# Patient Record
Sex: FEMALE | Race: WHITE | HISPANIC OR LATINO | URBAN - METROPOLITAN AREA
[De-identification: names, ages, dates, MRNs, and addresses within clinical notes are randomized per-mention and may not be internally consistent; named-entity substitution may affect disease eponyms.]

---

## 2017-05-01 ENCOUNTER — NEW PATIENT (OUTPATIENT)
Dept: URBAN - METROPOLITAN AREA CLINIC 56 | Facility: CLINIC | Age: 62
End: 2017-05-01
Payer: MEDICAID

## 2017-05-01 DIAGNOSIS — E10.9 TYPE 1 DIABETES MELLITUS WITHOUT COMPLICATIONS: ICD-10-CM

## 2017-05-01 PROCEDURE — 92004 COMPRE OPH EXAM NEW PT 1/>: CPT | Performed by: OPTOMETRIST

## 2017-05-01 PROCEDURE — 92250 FUNDUS PHOTOGRAPHY W/I&R: CPT | Performed by: OPTOMETRIST

## 2017-05-01 RX ORDER — ACYCLOVIR 800 MG/1
800 MG TABLET ORAL
Qty: 60 | Refills: 3 | Status: INACTIVE
Start: 2017-05-01 | End: 2017-05-10

## 2017-05-01 RX ORDER — PREDNISOLONE ACETATE 10 MG/ML
1 % SUSPENSION/ DROPS OPHTHALMIC
Qty: 1 | Refills: 1 | Status: INACTIVE
Start: 2017-05-01 | End: 2017-07-17

## 2017-05-01 ASSESSMENT — VISUAL ACUITY
OD: 20/20
OS: 20/20

## 2017-05-01 ASSESSMENT — KERATOMETRY
OD: 45.40
OS: 46.11

## 2017-05-01 ASSESSMENT — INTRAOCULAR PRESSURE
OS: 19
OD: 18

## 2017-05-08 ENCOUNTER — FOLLOW UP ESTABLISHED (OUTPATIENT)
Dept: URBAN - METROPOLITAN AREA CLINIC 56 | Facility: CLINIC | Age: 62
End: 2017-05-08
Payer: MEDICAID

## 2017-05-08 PROCEDURE — 92012 INTRM OPH EXAM EST PATIENT: CPT | Performed by: OPTOMETRIST

## 2017-05-08 ASSESSMENT — INTRAOCULAR PRESSURE
OD: 14
OS: 14

## 2017-05-22 ENCOUNTER — FOLLOW UP ESTABLISHED (OUTPATIENT)
Dept: URBAN - METROPOLITAN AREA CLINIC 56 | Facility: CLINIC | Age: 62
End: 2017-05-22
Payer: MEDICAID

## 2017-05-22 DIAGNOSIS — B00.52 HERPESVIRAL KERATITIS: Primary | ICD-10-CM

## 2017-05-22 PROCEDURE — 92012 INTRM OPH EXAM EST PATIENT: CPT | Performed by: OPTOMETRIST

## 2017-05-22 ASSESSMENT — INTRAOCULAR PRESSURE
OS: 21
OD: 19

## 2017-06-05 ENCOUNTER — FOLLOW UP ESTABLISHED (OUTPATIENT)
Dept: URBAN - METROPOLITAN AREA CLINIC 56 | Facility: CLINIC | Age: 62
End: 2017-06-05
Payer: MEDICAID

## 2017-06-05 PROCEDURE — 92012 INTRM OPH EXAM EST PATIENT: CPT | Performed by: OPTOMETRIST

## 2017-06-05 ASSESSMENT — INTRAOCULAR PRESSURE
OS: 22
OD: 19

## 2017-07-17 ENCOUNTER — FOLLOW UP ESTABLISHED (OUTPATIENT)
Dept: URBAN - METROPOLITAN AREA CLINIC 56 | Facility: CLINIC | Age: 62
End: 2017-07-17
Payer: MEDICAID

## 2017-07-17 PROCEDURE — 92012 INTRM OPH EXAM EST PATIENT: CPT | Performed by: OPTOMETRIST

## 2017-07-17 RX ORDER — ACYCLOVIR 800 MG/1
800 MG TABLET ORAL
Qty: 60 | Refills: 6 | Status: INACTIVE
Start: 2017-07-17 | End: 2017-07-26

## 2017-07-17 RX ORDER — PREDNISOLONE ACETATE 10 MG/ML
1 % SUSPENSION/ DROPS OPHTHALMIC
Qty: 1 | Refills: 1 | Status: INACTIVE
Start: 2017-07-17 | End: 2017-08-14

## 2017-07-17 ASSESSMENT — INTRAOCULAR PRESSURE
OS: 20
OD: 19

## 2017-07-24 ENCOUNTER — FOLLOW UP ESTABLISHED (OUTPATIENT)
Dept: URBAN - METROPOLITAN AREA CLINIC 56 | Facility: CLINIC | Age: 62
End: 2017-07-24
Payer: MEDICAID

## 2017-07-24 PROCEDURE — 92012 INTRM OPH EXAM EST PATIENT: CPT | Performed by: OPTOMETRIST

## 2017-07-24 ASSESSMENT — INTRAOCULAR PRESSURE
OS: 20
OD: 22

## 2017-08-14 ENCOUNTER — FOLLOW UP ESTABLISHED (OUTPATIENT)
Dept: URBAN - METROPOLITAN AREA CLINIC 56 | Facility: CLINIC | Age: 62
End: 2017-08-14
Payer: MEDICAID

## 2017-08-14 PROCEDURE — 92012 INTRM OPH EXAM EST PATIENT: CPT | Performed by: OPTOMETRIST

## 2017-08-14 RX ORDER — PREDNISOLONE ACETATE 10 MG/ML
1 % SUSPENSION/ DROPS OPHTHALMIC
Qty: 1 | Refills: 1 | Status: INACTIVE
Start: 2017-08-14 | End: 2017-09-13

## 2017-08-14 ASSESSMENT — INTRAOCULAR PRESSURE
OD: 20
OS: 20

## 2017-09-13 ENCOUNTER — FOLLOW UP ESTABLISHED (OUTPATIENT)
Dept: URBAN - METROPOLITAN AREA CLINIC 56 | Facility: CLINIC | Age: 62
End: 2017-09-13
Payer: MEDICAID

## 2017-09-13 PROCEDURE — 92012 INTRM OPH EXAM EST PATIENT: CPT | Performed by: OPTOMETRIST

## 2017-09-13 RX ORDER — PREDNISOLONE ACETATE 10 MG/ML
1 % SUSPENSION/ DROPS OPHTHALMIC
Qty: 1 | Refills: 0 | Status: INACTIVE
Start: 2017-09-13 | End: 2017-10-02

## 2017-09-13 ASSESSMENT — INTRAOCULAR PRESSURE
OS: 19
OD: 19

## 2017-10-02 ENCOUNTER — FOLLOW UP ESTABLISHED (OUTPATIENT)
Dept: URBAN - METROPOLITAN AREA CLINIC 56 | Facility: CLINIC | Age: 62
End: 2017-10-02
Payer: MEDICAID

## 2017-10-02 DIAGNOSIS — H11.32 SUBCONJUNCTIVAL HEMORRHAGE OF LEFT EYE: Primary | ICD-10-CM

## 2017-10-02 DIAGNOSIS — R51 HEADACHE: ICD-10-CM

## 2017-10-02 PROCEDURE — 92014 COMPRE OPH EXAM EST PT 1/>: CPT | Performed by: OPTOMETRIST

## 2017-10-02 PROCEDURE — 92134 CPTRZ OPH DX IMG PST SGM RTA: CPT | Performed by: OPTOMETRIST

## 2017-10-02 ASSESSMENT — INTRAOCULAR PRESSURE
OD: 20
OS: 20

## 2017-11-08 ENCOUNTER — FOLLOW UP ESTABLISHED (OUTPATIENT)
Dept: URBAN - METROPOLITAN AREA CLINIC 56 | Facility: CLINIC | Age: 62
End: 2017-11-08
Payer: MEDICAID

## 2017-11-08 PROCEDURE — 92012 INTRM OPH EXAM EST PATIENT: CPT | Performed by: OPTOMETRIST

## 2017-11-08 RX ORDER — ACYCLOVIR 800 MG/1
800 MG TABLET ORAL
Qty: 78 | Refills: 6 | Status: INACTIVE
Start: 2017-11-08 | End: 2017-11-21

## 2017-11-08 RX ORDER — PREDNISOLONE ACETATE 10 MG/ML
1 % SUSPENSION/ DROPS OPHTHALMIC
Qty: 1 | Refills: 3 | Status: INACTIVE
Start: 2017-11-08 | End: 2017-11-09

## 2017-11-08 ASSESSMENT — INTRAOCULAR PRESSURE
OD: 19
OS: 22

## 2017-11-10 ENCOUNTER — FOLLOW UP ESTABLISHED (OUTPATIENT)
Dept: URBAN - METROPOLITAN AREA CLINIC 56 | Facility: CLINIC | Age: 62
End: 2017-11-10
Payer: MEDICAID

## 2017-11-10 PROCEDURE — 92012 INTRM OPH EXAM EST PATIENT: CPT | Performed by: OPTOMETRIST

## 2017-11-10 ASSESSMENT — INTRAOCULAR PRESSURE
OS: 26
OD: 22

## 2017-11-13 ENCOUNTER — FOLLOW UP ESTABLISHED (OUTPATIENT)
Dept: URBAN - METROPOLITAN AREA CLINIC 56 | Facility: CLINIC | Age: 62
End: 2017-11-13
Payer: MEDICAID

## 2017-11-13 PROCEDURE — 92012 INTRM OPH EXAM EST PATIENT: CPT | Performed by: OPTOMETRIST

## 2017-11-13 ASSESSMENT — INTRAOCULAR PRESSURE
OS: 18
OD: 24

## 2017-11-16 ENCOUNTER — FOLLOW UP ESTABLISHED (OUTPATIENT)
Dept: URBAN - METROPOLITAN AREA CLINIC 44 | Facility: CLINIC | Age: 62
End: 2017-11-16
Payer: MEDICAID

## 2017-11-16 DIAGNOSIS — H26.492 OTHER SECONDARY CATARACT, LEFT EYE: ICD-10-CM

## 2017-11-16 PROCEDURE — 92012 INTRM OPH EXAM EST PATIENT: CPT | Performed by: OPHTHALMOLOGY

## 2017-11-16 RX ORDER — TIMOLOL MALEATE 5 MG/ML
0.5 % SOLUTION/ DROPS OPHTHALMIC
Qty: 1 | Refills: 2 | Status: INACTIVE
Start: 2017-11-16 | End: 2017-12-12

## 2017-11-16 RX ORDER — ATROPINE SULFATE 10 MG/ML
1 % SOLUTION/ DROPS OPHTHALMIC
Qty: 1 | Refills: 1 | Status: INACTIVE
Start: 2017-11-16 | End: 2017-12-12

## 2017-11-21 ENCOUNTER — FOLLOW UP ESTABLISHED (OUTPATIENT)
Dept: URBAN - METROPOLITAN AREA CLINIC 56 | Facility: CLINIC | Age: 62
End: 2017-11-21
Payer: MEDICAID

## 2017-11-21 PROCEDURE — 92012 INTRM OPH EXAM EST PATIENT: CPT | Performed by: OPTOMETRIST

## 2017-11-21 RX ORDER — ACYCLOVIR 800 MG/1
800 MG TABLET ORAL
Qty: 150 | Refills: 6 | Status: INACTIVE
Start: 2017-11-21 | End: 2018-07-16

## 2017-11-21 ASSESSMENT — INTRAOCULAR PRESSURE
OS: 12
OD: 12

## 2017-12-12 ENCOUNTER — FOLLOW UP ESTABLISHED (OUTPATIENT)
Dept: URBAN - METROPOLITAN AREA CLINIC 56 | Facility: CLINIC | Age: 62
End: 2017-12-12
Payer: MEDICAID

## 2017-12-12 PROCEDURE — 92012 INTRM OPH EXAM EST PATIENT: CPT | Performed by: OPTOMETRIST

## 2017-12-12 RX ORDER — ATROPINE SULFATE 10 MG/ML
1 % SOLUTION/ DROPS OPHTHALMIC
Qty: 1 | Refills: 1 | Status: INACTIVE
Start: 2017-12-12 | End: 2018-03-21

## 2017-12-12 RX ORDER — TIMOLOL MALEATE 5 MG/ML
0.5 % SOLUTION/ DROPS OPHTHALMIC
Qty: 1 | Refills: 2 | Status: INACTIVE
Start: 2017-12-12 | End: 2018-07-16

## 2017-12-12 ASSESSMENT — INTRAOCULAR PRESSURE
OD: 19
OS: 17

## 2018-01-19 ENCOUNTER — FOLLOW UP ESTABLISHED (OUTPATIENT)
Dept: URBAN - METROPOLITAN AREA CLINIC 56 | Facility: CLINIC | Age: 63
End: 2018-01-19
Payer: MEDICAID

## 2018-01-19 PROCEDURE — 92012 INTRM OPH EXAM EST PATIENT: CPT | Performed by: OPTOMETRIST

## 2018-01-19 ASSESSMENT — INTRAOCULAR PRESSURE
OD: 22
OS: 23

## 2018-03-21 ENCOUNTER — FOLLOW UP ESTABLISHED (OUTPATIENT)
Dept: URBAN - METROPOLITAN AREA CLINIC 56 | Facility: CLINIC | Age: 63
End: 2018-03-21
Payer: MEDICAID

## 2018-03-21 PROCEDURE — 92012 INTRM OPH EXAM EST PATIENT: CPT | Performed by: OPTOMETRIST

## 2018-03-21 RX ORDER — PREDNISOLONE ACETATE 10 MG/ML
1 % SUSPENSION/ DROPS OPHTHALMIC
Qty: 1 | Refills: 1 | Status: INACTIVE
Start: 2018-03-21 | End: 2018-05-16

## 2018-03-21 ASSESSMENT — INTRAOCULAR PRESSURE
OD: 18
OS: 18

## 2018-04-18 ENCOUNTER — FOLLOW UP ESTABLISHED (OUTPATIENT)
Dept: URBAN - METROPOLITAN AREA CLINIC 56 | Facility: CLINIC | Age: 63
End: 2018-04-18
Payer: MEDICAID

## 2018-04-18 PROCEDURE — 92012 INTRM OPH EXAM EST PATIENT: CPT | Performed by: OPTOMETRIST

## 2018-04-18 ASSESSMENT — INTRAOCULAR PRESSURE
OS: 17
OD: 19

## 2018-05-16 ENCOUNTER — FOLLOW UP ESTABLISHED (OUTPATIENT)
Dept: URBAN - METROPOLITAN AREA CLINIC 56 | Facility: CLINIC | Age: 63
End: 2018-05-16
Payer: MEDICAID

## 2018-05-16 PROCEDURE — 92012 INTRM OPH EXAM EST PATIENT: CPT | Performed by: OPTOMETRIST

## 2018-05-16 RX ORDER — PREDNISOLONE ACETATE 10 MG/ML
1 % SUSPENSION/ DROPS OPHTHALMIC
Qty: 1 | Refills: 1 | Status: INACTIVE
Start: 2018-05-16 | End: 2019-01-09

## 2018-05-16 ASSESSMENT — INTRAOCULAR PRESSURE
OD: 19
OS: 17

## 2018-06-13 ENCOUNTER — FOLLOW UP ESTABLISHED (OUTPATIENT)
Dept: URBAN - METROPOLITAN AREA CLINIC 56 | Facility: CLINIC | Age: 63
End: 2018-06-13
Payer: MEDICAID

## 2018-06-13 DIAGNOSIS — H04.123 TEAR FILM INSUFFICIENCY OF BILATERAL LACRIMAL GLANDS: ICD-10-CM

## 2018-06-13 PROCEDURE — 92012 INTRM OPH EXAM EST PATIENT: CPT | Performed by: OPTOMETRIST

## 2018-06-13 RX ORDER — HYPROMELLOSE 3 MG/G
0.3 % GEL OPHTHALMIC
Qty: 1 | Refills: 0 | Status: ACTIVE
Start: 2018-06-13

## 2018-06-13 ASSESSMENT — INTRAOCULAR PRESSURE
OS: 16
OD: 16

## 2018-07-16 ENCOUNTER — FOLLOW UP ESTABLISHED (OUTPATIENT)
Dept: URBAN - METROPOLITAN AREA CLINIC 56 | Facility: CLINIC | Age: 63
End: 2018-07-16
Payer: MEDICAID

## 2018-07-16 DIAGNOSIS — Z96.1 PRESENCE OF INTRAOCULAR LENS: ICD-10-CM

## 2018-07-16 PROCEDURE — 92134 CPTRZ OPH DX IMG PST SGM RTA: CPT | Performed by: OPTOMETRIST

## 2018-07-16 PROCEDURE — 92014 COMPRE OPH EXAM EST PT 1/>: CPT | Performed by: OPTOMETRIST

## 2018-07-16 RX ORDER — ACYCLOVIR 800 MG/1
800 MG TABLET ORAL
Qty: 150 | Refills: 6 | Status: INACTIVE
Start: 2018-07-16 | End: 2019-01-09

## 2018-07-16 RX ORDER — TIMOLOL MALEATE 5 MG/ML
0.5 % SOLUTION/ DROPS OPHTHALMIC
Qty: 3 | Refills: 3 | Status: INACTIVE
Start: 2018-07-16 | End: 2019-01-09

## 2018-07-16 ASSESSMENT — VISUAL ACUITY
OD: 20/20
OS: 20/25

## 2018-07-16 ASSESSMENT — INTRAOCULAR PRESSURE
OD: 17
OS: 17

## 2018-07-16 ASSESSMENT — KERATOMETRY
OS: 45.34
OD: 45.74

## 2019-01-09 ENCOUNTER — FOLLOW UP ESTABLISHED (OUTPATIENT)
Dept: URBAN - METROPOLITAN AREA CLINIC 56 | Facility: CLINIC | Age: 64
End: 2019-01-09
Payer: MEDICAID

## 2019-01-09 PROCEDURE — 92012 INTRM OPH EXAM EST PATIENT: CPT | Performed by: OPTOMETRIST

## 2019-01-09 RX ORDER — TIMOLOL MALEATE 5 MG/ML
0.5 % SOLUTION/ DROPS OPHTHALMIC
Qty: 3 | Refills: 3 | Status: INACTIVE
Start: 2019-01-09 | End: 2019-08-09

## 2019-01-09 RX ORDER — PREDNISOLONE ACETATE 10 MG/ML
1 % SUSPENSION/ DROPS OPHTHALMIC
Qty: 1 | Refills: 1 | Status: INACTIVE
Start: 2019-01-09 | End: 2019-05-29

## 2019-01-09 RX ORDER — ACYCLOVIR 800 MG/1
800 MG TABLET ORAL
Qty: 150 | Refills: 5 | Status: INACTIVE
Start: 2019-01-09 | End: 2019-08-09

## 2019-01-09 ASSESSMENT — INTRAOCULAR PRESSURE
OD: 18
OS: 18

## 2019-05-29 ENCOUNTER — FOLLOW UP ESTABLISHED (OUTPATIENT)
Dept: URBAN - METROPOLITAN AREA CLINIC 56 | Facility: CLINIC | Age: 64
End: 2019-05-29
Payer: MEDICAID

## 2019-05-29 PROCEDURE — 92012 INTRM OPH EXAM EST PATIENT: CPT | Performed by: OPTOMETRIST

## 2019-05-29 RX ORDER — PREDNISOLONE ACETATE 10 MG/ML
1 % SUSPENSION/ DROPS OPHTHALMIC
Qty: 1 | Refills: 1 | Status: INACTIVE
Start: 2019-05-29 | End: 2019-08-09

## 2019-05-29 ASSESSMENT — INTRAOCULAR PRESSURE
OS: 18
OD: 18

## 2019-08-09 ENCOUNTER — FOLLOW UP ESTABLISHED (OUTPATIENT)
Dept: URBAN - METROPOLITAN AREA CLINIC 56 | Facility: CLINIC | Age: 64
End: 2019-08-09
Payer: MEDICAID

## 2019-08-09 DIAGNOSIS — E11.9 TYPE 2 DIABETES MELLITUS WITHOUT COMPLICATIONS: ICD-10-CM

## 2019-08-09 PROCEDURE — 92014 COMPRE OPH EXAM EST PT 1/>: CPT | Performed by: OPTOMETRIST

## 2019-08-09 PROCEDURE — 92134 CPTRZ OPH DX IMG PST SGM RTA: CPT | Performed by: OPTOMETRIST

## 2019-08-09 RX ORDER — PREDNISOLONE ACETATE 10 MG/ML
1 % SUSPENSION/ DROPS OPHTHALMIC
Qty: 1 | Refills: 1 | Status: INACTIVE
Start: 2019-08-09 | End: 2019-09-24

## 2019-08-09 RX ORDER — ACYCLOVIR 800 MG/1
800 MG TABLET ORAL
Qty: 150 | Refills: 5 | Status: INACTIVE
Start: 2019-08-09 | End: 2019-11-22

## 2019-08-09 ASSESSMENT — INTRAOCULAR PRESSURE
OS: 18
OD: 20

## 2019-08-09 ASSESSMENT — KERATOMETRY
OS: 45.49
OD: 45.52

## 2019-08-09 ASSESSMENT — VISUAL ACUITY
OS: 20/25
OD: 20/20

## 2020-03-09 ENCOUNTER — FOLLOW UP ESTABLISHED (OUTPATIENT)
Dept: URBAN - METROPOLITAN AREA CLINIC 56 | Facility: CLINIC | Age: 65
End: 2020-03-09
Payer: MEDICAID

## 2020-03-09 DIAGNOSIS — B00.51 HERPESVIRAL IRIDOCYCLITIS: Primary | ICD-10-CM

## 2020-03-09 PROCEDURE — 92012 INTRM OPH EXAM EST PATIENT: CPT | Performed by: OPTOMETRIST

## 2020-03-09 ASSESSMENT — INTRAOCULAR PRESSURE
OD: 20
OS: 19

## 2021-03-23 ENCOUNTER — FOLLOW UP ESTABLISHED (OUTPATIENT)
Dept: URBAN - METROPOLITAN AREA CLINIC 56 | Facility: CLINIC | Age: 66
End: 2021-03-23
Payer: MEDICAID

## 2021-03-23 DIAGNOSIS — H40.052 OCULAR HYPERTENSION, LEFT EYE: ICD-10-CM

## 2021-03-23 DIAGNOSIS — Z79.4 LONG TERM (CURRENT) USE OF INSULIN: ICD-10-CM

## 2021-03-23 DIAGNOSIS — H52.4 PRESBYOPIA: ICD-10-CM

## 2021-03-23 PROCEDURE — 92250 FUNDUS PHOTOGRAPHY W/I&R: CPT | Performed by: OPTOMETRIST

## 2021-03-23 PROCEDURE — 92014 COMPRE OPH EXAM EST PT 1/>: CPT | Performed by: OPTOMETRIST

## 2021-03-23 RX ORDER — ACYCLOVIR 800 MG/1
800 MG TABLET ORAL
Qty: 90 | Refills: 3 | Status: INACTIVE
Start: 2021-03-23 | End: 2021-08-02

## 2021-03-23 ASSESSMENT — KERATOMETRY
OS: 45.90
OD: 45.80

## 2021-03-23 ASSESSMENT — VISUAL ACUITY
OS: 20/50
OD: 20/20

## 2021-03-23 ASSESSMENT — INTRAOCULAR PRESSURE
OD: 20
OS: 23

## 2021-04-02 ENCOUNTER — ADULT PHYSICAL (OUTPATIENT)
Dept: URBAN - METROPOLITAN AREA CLINIC 56 | Facility: CLINIC | Age: 66
End: 2021-04-02
Payer: MEDICAID

## 2021-04-02 DIAGNOSIS — Z01.818 ENCOUNTER FOR OTHER PREPROCEDURAL EXAMINATION: Primary | ICD-10-CM

## 2021-04-02 PROCEDURE — 99203 OFFICE O/P NEW LOW 30 MIN: CPT | Performed by: PHYSICIAN ASSISTANT

## 2021-04-09 ENCOUNTER — SURGERY (OUTPATIENT)
Dept: URBAN - METROPOLITAN AREA SURGERY 19 | Facility: SURGERY | Age: 66
End: 2021-04-09
Payer: MEDICAID

## 2021-04-09 PROCEDURE — 66821 AFTER CATARACT LASER SURGERY: CPT | Performed by: OPHTHALMOLOGY

## 2021-08-02 ENCOUNTER — OFFICE VISIT (OUTPATIENT)
Dept: URBAN - METROPOLITAN AREA CLINIC 56 | Facility: CLINIC | Age: 66
End: 2021-08-02
Payer: COMMERCIAL

## 2021-08-02 PROCEDURE — 99213 OFFICE O/P EST LOW 20 MIN: CPT | Performed by: OPTOMETRIST

## 2021-08-02 RX ORDER — ACYCLOVIR 800 MG/1
800 MG TABLET ORAL
Qty: 90 | Refills: 3 | Status: ACTIVE
Start: 2021-08-02

## 2021-08-02 ASSESSMENT — INTRAOCULAR PRESSURE
OS: 21
OD: 18

## 2021-08-02 NOTE — IMPRESSION/PLAN
Impression: Herpesviral iridocyclitis
-Eye is quiet, IOP is good. Plan: Continue Acyclovir 800 mg QD. Continue Pred Forte QD OS. RTC in 4 months for IOP check

## 2021-12-06 ENCOUNTER — OFFICE VISIT (OUTPATIENT)
Dept: URBAN - METROPOLITAN AREA CLINIC 56 | Facility: CLINIC | Age: 66
End: 2021-12-06
Payer: COMMERCIAL

## 2021-12-06 PROCEDURE — 99213 OFFICE O/P EST LOW 20 MIN: CPT | Performed by: OPTOMETRIST

## 2021-12-06 ASSESSMENT — INTRAOCULAR PRESSURE
OS: 20
OD: 19

## 2021-12-06 NOTE — IMPRESSION/PLAN
Impression: Herpesviral iridocyclitis
-Eye is quiet, IOP is good. Plan: Continue Acyclovir 800 mg QD. Continue Pred Forte QD OS. RTC in 4 months for CE

## 2022-04-04 ENCOUNTER — OFFICE VISIT (OUTPATIENT)
Dept: URBAN - METROPOLITAN AREA CLINIC 56 | Facility: CLINIC | Age: 67
End: 2022-04-04
Payer: COMMERCIAL

## 2022-04-04 PROCEDURE — 92250 FUNDUS PHOTOGRAPHY W/I&R: CPT | Performed by: OPTOMETRIST

## 2022-04-04 PROCEDURE — 92134 CPTRZ OPH DX IMG PST SGM RTA: CPT | Performed by: OPTOMETRIST

## 2022-04-04 PROCEDURE — 99214 OFFICE O/P EST MOD 30 MIN: CPT | Performed by: OPTOMETRIST

## 2022-04-04 ASSESSMENT — KERATOMETRY
OS: 46.08
OD: 45.65

## 2022-04-04 ASSESSMENT — INTRAOCULAR PRESSURE
OS: 19
OD: 19

## 2022-04-04 ASSESSMENT — VISUAL ACUITY
OD: 20/20
OS: 20/70

## 2022-04-04 NOTE — IMPRESSION/PLAN
Impression: Presence of intraocular lens: Z96.1. Plan: Well positioned (Josselyn Mortensen S/p YAG OS

## 2022-04-04 NOTE — IMPRESSION/PLAN
Impression: Herpesviral iridocyclitis
-Eye is quiet, IOP is good. Plan: Continue Acyclovir 800 mg QD. Continue Pred Forte QD OS. There are cystic spaces on OCT MAC today that are new. Recommend retina consult.

## 2022-05-04 ENCOUNTER — OFFICE VISIT (OUTPATIENT)
Dept: URBAN - METROPOLITAN AREA CLINIC 56 | Facility: CLINIC | Age: 67
End: 2022-05-04
Payer: COMMERCIAL

## 2022-05-04 DIAGNOSIS — Z79.4 LONG TERM (CURRENT) USE OF INSULIN: ICD-10-CM

## 2022-05-04 DIAGNOSIS — B00.51 HERPESVIRAL IRIDOCYCLITIS: Primary | ICD-10-CM

## 2022-05-04 DIAGNOSIS — E11.3293 DIABETES MELLITUS TYPE 2 WITH MILD NON-PROLIFERATIVE RETINOPATHY WITHOUT MACULAR EDEMA, BILATERAL: ICD-10-CM

## 2022-05-04 PROCEDURE — 92235 FLUORESCEIN ANGRPH MLTIFRAME: CPT | Performed by: OPHTHALMOLOGY

## 2022-05-04 PROCEDURE — 92014 COMPRE OPH EXAM EST PT 1/>: CPT | Performed by: OPHTHALMOLOGY

## 2022-05-04 PROCEDURE — 92134 CPTRZ OPH DX IMG PST SGM RTA: CPT | Performed by: OPHTHALMOLOGY

## 2022-05-04 ASSESSMENT — INTRAOCULAR PRESSURE
OS: 19
OD: 16

## 2022-05-04 NOTE — IMPRESSION/PLAN
Impression: Herpesviral iridocyclitis Plan: History of recurrent HSV - cornea stable, scar remains. Continue same regimen, acyclovir 800mg daily and PF OS daily. Continue follow-up in general clinic and cornea if needed. 

Recommend monitoring of kidney function with PCP as continues on long-term anti-viral

## 2022-05-04 NOTE — IMPRESSION/PLAN
Impression: Diabetes mellitus Type 2 with mild non-proliferative retinopathy without macular edema, bilateral: L97.3357.  Plan: Stressed the importance compliance, blood glucose, and blood pressure control in preventing progression of retinopathy and/or maculopathy and potentially irreversible vision loss and blindness

## 2022-08-22 ENCOUNTER — OFFICE VISIT (OUTPATIENT)
Dept: URBAN - METROPOLITAN AREA CLINIC 56 | Facility: CLINIC | Age: 67
End: 2022-08-22
Payer: COMMERCIAL

## 2022-08-22 DIAGNOSIS — B00.51 HERPESVIRAL IRIDOCYCLITIS: Primary | ICD-10-CM

## 2022-08-22 PROCEDURE — 99213 OFFICE O/P EST LOW 20 MIN: CPT | Performed by: OPTOMETRIST

## 2022-08-22 ASSESSMENT — INTRAOCULAR PRESSURE
OS: 18
OD: 15

## 2022-08-22 NOTE — IMPRESSION/PLAN
Impression: Herpesviral iridocyclitis
-Eye is quiet, IOP is good. Plan: Continue Acyclovir 800 mg QD. Continue Pred Forte QD OS. Recommend PCP monitor kidney function - on long term antivirals.

## 2022-12-27 ENCOUNTER — OFFICE VISIT (OUTPATIENT)
Dept: URBAN - METROPOLITAN AREA CLINIC 56 | Facility: LOCATION | Age: 67
End: 2022-12-27
Payer: COMMERCIAL

## 2022-12-27 DIAGNOSIS — B00.51 HERPESVIRAL IRIDOCYCLITIS: Primary | ICD-10-CM

## 2022-12-27 DIAGNOSIS — H04.123 DRY EYE SYNDROME OF BILATERAL LACRIMAL GLANDS: ICD-10-CM

## 2022-12-27 PROCEDURE — 99213 OFFICE O/P EST LOW 20 MIN: CPT | Performed by: OPTOMETRIST

## 2022-12-27 RX ORDER — ACYCLOVIR 800 MG/1
800 MG TABLET ORAL
Qty: 90 | Refills: 3 | Status: ACTIVE
Start: 2022-12-27

## 2022-12-27 RX ORDER — PREDNISOLONE ACETATE 10 MG/ML
1 % SUSPENSION/ DROPS OPHTHALMIC
Qty: 5 | Refills: 1 | Status: ACTIVE
Start: 2022-12-27

## 2022-12-27 ASSESSMENT — INTRAOCULAR PRESSURE
OD: 15
OS: 17

## 2022-12-27 NOTE — IMPRESSION/PLAN
Impression: Herpesviral iridocyclitis
-Eye is quiet, IOP is good. Plan: No changes today. Continue Acyclovir 800 mg QD. Continue Pred Forte QD OS. Has been taking QOD with no flare ups - okay to continue this. Recommend PCP monitor kidney function - on long term antivirals.

## 2022-12-27 NOTE — IMPRESSION/PLAN
Impression: Dry eye syndrome of bilateral lacrimal glands: H04.123. Plan: Explained chronic nature of condition- daily maintenance is needed and there is no cure. Start Artificial Tears QID OU. Start Systane Gel (tube) at night OU. Start dry warm compresses BID for 5 minutes duration.

## 2023-04-04 ENCOUNTER — OFFICE VISIT (OUTPATIENT)
Dept: URBAN - METROPOLITAN AREA CLINIC 56 | Facility: LOCATION | Age: 68
End: 2023-04-04
Payer: COMMERCIAL

## 2023-04-04 DIAGNOSIS — B00.51 HERPESVIRAL IRIDOCYCLITIS: ICD-10-CM

## 2023-04-04 DIAGNOSIS — Z79.4 LONG TERM (CURRENT) USE OF INSULIN: ICD-10-CM

## 2023-04-04 DIAGNOSIS — Z96.1 PRESENCE OF INTRAOCULAR LENS: ICD-10-CM

## 2023-04-04 DIAGNOSIS — E11.9 TYPE 2 DIABETES MELLITUS WITHOUT COMPLICATIONS: Primary | ICD-10-CM

## 2023-04-04 PROCEDURE — 92014 COMPRE OPH EXAM EST PT 1/>: CPT | Performed by: OPTOMETRIST

## 2023-04-04 PROCEDURE — 92134 CPTRZ OPH DX IMG PST SGM RTA: CPT | Performed by: OPTOMETRIST

## 2023-04-04 RX ORDER — PREDNISOLONE ACETATE 10 MG/ML
1 % SUSPENSION/ DROPS OPHTHALMIC
Qty: 0 | Refills: 0 | Status: INACTIVE
Start: 2023-04-04 | End: 2023-04-04

## 2023-04-04 RX ORDER — PREDNISOLONE ACETATE 10 MG/ML
1 % SUSPENSION/ DROPS OPHTHALMIC
Qty: 5 | Refills: 1 | Status: ACTIVE
Start: 2023-04-04

## 2023-04-04 ASSESSMENT — INTRAOCULAR PRESSURE
OD: 22
OS: 21

## 2023-04-04 ASSESSMENT — KERATOMETRY
OS: 45.80
OD: 44.30

## 2023-04-04 ASSESSMENT — VISUAL ACUITY
OD: 20/20
OS: 20/25

## 2023-04-04 NOTE — IMPRESSION/PLAN
Impression: Type 2 diabetes mellitus without complications: U20.8. Plan: No diabetic retinopathy. Recommend yearly diabetic eye exam. Discussed with patient importance of good blood sugar control.

## 2023-04-04 NOTE — IMPRESSION/PLAN
Impression: Herpesviral iridocyclitis
-Eye is quiet, IOP is good. Plan: Stable. Continue Acyclovir 800 mg QD. Continue Pred Forte QD OS. Has been taking QOD with no flare ups - okay to continue this.

## 2023-12-12 ENCOUNTER — OFFICE VISIT (OUTPATIENT)
Dept: URBAN - METROPOLITAN AREA CLINIC 56 | Facility: LOCATION | Age: 68
End: 2023-12-12
Payer: COMMERCIAL

## 2023-12-12 DIAGNOSIS — H16.223 KERATOCONJUNCTIVITIS SICCA, BILATERAL: ICD-10-CM

## 2023-12-12 DIAGNOSIS — B00.51 HERPESVIRAL IRIDOCYCLITIS: Primary | ICD-10-CM

## 2023-12-12 PROCEDURE — 99213 OFFICE O/P EST LOW 20 MIN: CPT | Performed by: STUDENT IN AN ORGANIZED HEALTH CARE EDUCATION/TRAINING PROGRAM

## 2023-12-12 RX ORDER — PREDNISOLONE ACETATE 10 MG/ML
1 % SUSPENSION/ DROPS OPHTHALMIC
Qty: 5 | Refills: 2 | Status: ACTIVE
Start: 2023-12-12

## 2023-12-12 ASSESSMENT — INTRAOCULAR PRESSURE
OD: 13
OS: 13

## 2024-04-12 ENCOUNTER — OFFICE VISIT (OUTPATIENT)
Dept: URBAN - METROPOLITAN AREA CLINIC 56 | Facility: LOCATION | Age: 69
End: 2024-04-12
Payer: COMMERCIAL

## 2024-04-12 DIAGNOSIS — B00.51 HERPESVIRAL IRIDOCYCLITIS: ICD-10-CM

## 2024-04-12 DIAGNOSIS — H43.811 VITREOUS DEGENERATION, RIGHT EYE: ICD-10-CM

## 2024-04-12 DIAGNOSIS — H04.123 DRY EYE SYNDROME OF BILATERAL LACRIMAL GLANDS: ICD-10-CM

## 2024-04-12 DIAGNOSIS — Z79.4 LONG TERM (CURRENT) USE OF INSULIN: ICD-10-CM

## 2024-04-12 DIAGNOSIS — E11.3293 TYPE 2 DIABETES MELLITUS WITH MILD NONPROLIFERATIVE DIABETIC RETINOPATHY WITHOUT MACULAR EDEMA, BILATERAL: Primary | ICD-10-CM

## 2024-04-12 DIAGNOSIS — Z96.1 PRESENCE OF INTRAOCULAR LENS: ICD-10-CM

## 2024-04-12 PROCEDURE — 92014 COMPRE OPH EXAM EST PT 1/>: CPT | Performed by: OPTOMETRIST

## 2024-04-12 PROCEDURE — 92134 CPTRZ OPH DX IMG PST SGM RTA: CPT | Performed by: OPTOMETRIST

## 2024-04-12 RX ORDER — PREDNISOLONE ACETATE 10 MG/ML
1 % SUSPENSION/ DROPS OPHTHALMIC
Qty: 5 | Refills: 3 | Status: ACTIVE
Start: 2024-04-12

## 2024-04-12 ASSESSMENT — INTRAOCULAR PRESSURE
OS: 14
OD: 14

## 2024-04-12 ASSESSMENT — KERATOMETRY
OD: 45.52
OS: 45.80

## 2024-04-12 ASSESSMENT — VISUAL ACUITY
OS: 20/25
OD: 20/20

## 2024-10-14 ENCOUNTER — OFFICE VISIT (OUTPATIENT)
Dept: URBAN - METROPOLITAN AREA CLINIC 56 | Facility: LOCATION | Age: 69
End: 2024-10-14
Payer: COMMERCIAL

## 2024-10-14 DIAGNOSIS — B00.51 HERPESVIRAL IRIDOCYCLITIS: Primary | ICD-10-CM

## 2024-10-14 PROCEDURE — 99213 OFFICE O/P EST LOW 20 MIN: CPT | Performed by: OPTOMETRIST

## 2024-10-14 RX ORDER — PREDNISOLONE ACETATE 10 MG/ML
1 % SUSPENSION/ DROPS OPHTHALMIC
Qty: 5 | Refills: 3 | Status: INACTIVE
Start: 2024-10-14 | End: 2024-10-14

## 2024-10-14 RX ORDER — PREDNISOLONE ACETATE 10 MG/ML
1 % SUSPENSION/ DROPS OPHTHALMIC
Qty: 5 | Refills: 3 | Status: ACTIVE
Start: 2024-10-14

## 2024-10-14 ASSESSMENT — INTRAOCULAR PRESSURE
OD: 15
OS: 15

## 2025-04-14 ENCOUNTER — OFFICE VISIT (OUTPATIENT)
Dept: URBAN - METROPOLITAN AREA CLINIC 56 | Facility: LOCATION | Age: 70
End: 2025-04-14
Payer: COMMERCIAL

## 2025-04-14 DIAGNOSIS — Z79.4 LONG TERM (CURRENT) USE OF INSULIN: ICD-10-CM

## 2025-04-14 DIAGNOSIS — Z96.1 PRESENCE OF INTRAOCULAR LENS: ICD-10-CM

## 2025-04-14 DIAGNOSIS — H43.811 VITREOUS DEGENERATION, RIGHT EYE: ICD-10-CM

## 2025-04-14 DIAGNOSIS — H04.123 DRY EYE SYNDROME OF BILATERAL LACRIMAL GLANDS: ICD-10-CM

## 2025-04-14 DIAGNOSIS — E11.9 TYPE 2 DIABETES MELLITUS W/O COMPLICATION: Primary | ICD-10-CM

## 2025-04-14 DIAGNOSIS — H52.4 PRESBYOPIA: ICD-10-CM

## 2025-04-14 DIAGNOSIS — B00.51 HERPESVIRAL IRIDOCYCLITIS: ICD-10-CM

## 2025-04-14 PROCEDURE — 92014 COMPRE OPH EXAM EST PT 1/>: CPT | Performed by: OPTOMETRIST

## 2025-04-14 ASSESSMENT — VISUAL ACUITY
OS: 20/25
OD: 20/20

## 2025-04-14 ASSESSMENT — INTRAOCULAR PRESSURE
OS: 16
OD: 14

## 2025-04-14 ASSESSMENT — KERATOMETRY
OD: 45.40
OS: 45.84